# Patient Record
Sex: MALE | Race: BLACK OR AFRICAN AMERICAN | NOT HISPANIC OR LATINO
[De-identification: names, ages, dates, MRNs, and addresses within clinical notes are randomized per-mention and may not be internally consistent; named-entity substitution may affect disease eponyms.]

---

## 2018-01-04 PROBLEM — Z00.00 ENCOUNTER FOR PREVENTIVE HEALTH EXAMINATION: Status: ACTIVE | Noted: 2018-01-04

## 2018-01-17 ENCOUNTER — TRANSCRIPTION ENCOUNTER (OUTPATIENT)
Age: 28
End: 2018-01-17

## 2018-01-17 ENCOUNTER — OUTPATIENT (OUTPATIENT)
Dept: OUTPATIENT SERVICES | Facility: HOSPITAL | Age: 28
LOS: 1 days | Discharge: HOME | End: 2018-01-17

## 2018-01-17 ENCOUNTER — APPOINTMENT (OUTPATIENT)
Dept: INTERNAL MEDICINE | Facility: CLINIC | Age: 28
End: 2018-01-17

## 2018-01-17 VITALS
DIASTOLIC BLOOD PRESSURE: 75 MMHG | HEIGHT: 69 IN | HEART RATE: 72 BPM | WEIGHT: 202 LBS | BODY MASS INDEX: 29.92 KG/M2 | SYSTOLIC BLOOD PRESSURE: 117 MMHG

## 2018-01-17 DIAGNOSIS — Z87.09 PERSONAL HISTORY OF OTHER DISEASES OF THE RESPIRATORY SYSTEM: ICD-10-CM

## 2018-01-17 DIAGNOSIS — Z87.19 PERSONAL HISTORY OF OTHER DISEASES OF THE DIGESTIVE SYSTEM: ICD-10-CM

## 2018-01-17 DIAGNOSIS — J45.20 MILD INTERMITTENT ASTHMA, UNCOMPLICATED: ICD-10-CM

## 2018-01-17 DIAGNOSIS — Z82.69 FAMILY HISTORY OF OTHER DISEASES OF THE MUSCULOSKELETAL SYSTEM AND CONNECTIVE TISSUE: ICD-10-CM

## 2018-01-17 DIAGNOSIS — F17.200 NICOTINE DEPENDENCE, UNSPECIFIED, UNCOMPLICATED: ICD-10-CM

## 2018-01-17 DIAGNOSIS — Z83.3 FAMILY HISTORY OF DIABETES MELLITUS: ICD-10-CM

## 2018-01-17 DIAGNOSIS — Z82.5 FAMILY HISTORY OF ASTHMA AND OTHER CHRONIC LOWER RESPIRATORY DISEASES: ICD-10-CM

## 2018-01-17 RX ORDER — ALBUTEROL SULFATE 90 UG/1
108 (90 BASE) AEROSOL, METERED RESPIRATORY (INHALATION)
Qty: 3 | Refills: 5 | Status: ACTIVE | COMMUNITY
Start: 2018-01-17 | End: 1900-01-01

## 2018-01-17 RX ORDER — ALBUTEROL 90 MCG
90 AEROSOL (GRAM) INHALATION
Refills: 0 | Status: ACTIVE | COMMUNITY

## 2018-07-11 ENCOUNTER — APPOINTMENT (OUTPATIENT)
Dept: INTERNAL MEDICINE | Facility: CLINIC | Age: 28
End: 2018-07-11

## 2018-11-06 ENCOUNTER — EMERGENCY (EMERGENCY)
Facility: HOSPITAL | Age: 28
LOS: 0 days | Discharge: LEFT BEFORE DISCHARGE | End: 2018-11-06
Attending: EMERGENCY MEDICINE | Admitting: EMERGENCY MEDICINE

## 2018-11-06 VITALS
SYSTOLIC BLOOD PRESSURE: 138 MMHG | HEART RATE: 92 BPM | TEMPERATURE: 100 F | RESPIRATION RATE: 16 BRPM | DIASTOLIC BLOOD PRESSURE: 72 MMHG | OXYGEN SATURATION: 100 %

## 2018-11-06 VITALS
HEART RATE: 82 BPM | DIASTOLIC BLOOD PRESSURE: 60 MMHG | RESPIRATION RATE: 18 BRPM | SYSTOLIC BLOOD PRESSURE: 120 MMHG | TEMPERATURE: 98 F

## 2018-11-06 DIAGNOSIS — Z90.49 ACQUIRED ABSENCE OF OTHER SPECIFIED PARTS OF DIGESTIVE TRACT: Chronic | ICD-10-CM

## 2018-11-06 DIAGNOSIS — N49.2 INFLAMMATORY DISORDERS OF SCROTUM: ICD-10-CM

## 2018-11-06 DIAGNOSIS — Z90.49 ACQUIRED ABSENCE OF OTHER SPECIFIED PARTS OF DIGESTIVE TRACT: ICD-10-CM

## 2018-11-06 LAB
ALBUMIN SERPL ELPH-MCNC: 4.5 G/DL — SIGNIFICANT CHANGE UP (ref 3.5–5.2)
ALP SERPL-CCNC: 69 U/L — SIGNIFICANT CHANGE UP (ref 30–115)
ALT FLD-CCNC: 22 U/L — SIGNIFICANT CHANGE UP (ref 0–41)
ANION GAP SERPL CALC-SCNC: 17 MMOL/L — HIGH (ref 7–14)
APTT BLD: 33 SEC — SIGNIFICANT CHANGE UP (ref 27–39.2)
AST SERPL-CCNC: 23 U/L — SIGNIFICANT CHANGE UP (ref 0–41)
BASE EXCESS BLDV CALC-SCNC: 1.7 MMOL/L — SIGNIFICANT CHANGE UP (ref -2–2)
BASOPHILS # BLD AUTO: 0.05 K/UL — SIGNIFICANT CHANGE UP (ref 0–0.2)
BASOPHILS NFR BLD AUTO: 0.3 % — SIGNIFICANT CHANGE UP (ref 0–1)
BILIRUB SERPL-MCNC: 0.4 MG/DL — SIGNIFICANT CHANGE UP (ref 0.2–1.2)
BLD GP AB SCN SERPL QL: SIGNIFICANT CHANGE UP
BUN SERPL-MCNC: 11 MG/DL — SIGNIFICANT CHANGE UP (ref 10–20)
CA-I SERPL-SCNC: 1.19 MMOL/L — SIGNIFICANT CHANGE UP (ref 1.12–1.3)
CALCIUM SERPL-MCNC: 9.5 MG/DL — SIGNIFICANT CHANGE UP (ref 8.5–10.1)
CHLORIDE SERPL-SCNC: 96 MMOL/L — LOW (ref 98–110)
CO2 SERPL-SCNC: 25 MMOL/L — SIGNIFICANT CHANGE UP (ref 17–32)
CREAT SERPL-MCNC: 0.9 MG/DL — SIGNIFICANT CHANGE UP (ref 0.7–1.5)
EOSINOPHIL # BLD AUTO: 0.04 K/UL — SIGNIFICANT CHANGE UP (ref 0–0.7)
EOSINOPHIL NFR BLD AUTO: 0.2 % — SIGNIFICANT CHANGE UP (ref 0–8)
GAS PNL BLDV: 138 MMOL/L — SIGNIFICANT CHANGE UP (ref 136–145)
GAS PNL BLDV: SIGNIFICANT CHANGE UP
GLUCOSE SERPL-MCNC: 86 MG/DL — SIGNIFICANT CHANGE UP (ref 70–99)
HCO3 BLDV-SCNC: 26 MMOL/L — SIGNIFICANT CHANGE UP (ref 22–29)
HCT VFR BLD CALC: 40.8 % — LOW (ref 42–52)
HCT VFR BLDA CALC: 42.1 % — SIGNIFICANT CHANGE UP (ref 34–44)
HGB BLD CALC-MCNC: 13.7 G/DL — LOW (ref 14–18)
HGB BLD-MCNC: 13.9 G/DL — LOW (ref 14–18)
IMM GRANULOCYTES NFR BLD AUTO: 0.6 % — HIGH (ref 0.1–0.3)
INR BLD: 1.16 RATIO — SIGNIFICANT CHANGE UP (ref 0.65–1.3)
LACTATE BLDV-MCNC: 0.8 MMOL/L — SIGNIFICANT CHANGE UP (ref 0.5–1.6)
LYMPHOCYTES # BLD AUTO: 1.54 K/UL — SIGNIFICANT CHANGE UP (ref 1.2–3.4)
LYMPHOCYTES # BLD AUTO: 7.8 % — LOW (ref 20.5–51.1)
MAGNESIUM SERPL-MCNC: 2 MG/DL — SIGNIFICANT CHANGE UP (ref 1.8–2.4)
MCHC RBC-ENTMCNC: 28 PG — SIGNIFICANT CHANGE UP (ref 27–31)
MCHC RBC-ENTMCNC: 34.1 G/DL — SIGNIFICANT CHANGE UP (ref 32–37)
MCV RBC AUTO: 82.1 FL — SIGNIFICANT CHANGE UP (ref 80–94)
MONOCYTES # BLD AUTO: 1.45 K/UL — HIGH (ref 0.1–0.6)
MONOCYTES NFR BLD AUTO: 7.4 % — SIGNIFICANT CHANGE UP (ref 1.7–9.3)
NEUTROPHILS # BLD AUTO: 16.52 K/UL — HIGH (ref 1.4–6.5)
NEUTROPHILS NFR BLD AUTO: 83.7 % — HIGH (ref 42.2–75.2)
NRBC # BLD: 0 /100 WBCS — SIGNIFICANT CHANGE UP (ref 0–0)
PCO2 BLDV: 41 MMHG — SIGNIFICANT CHANGE UP (ref 41–51)
PH BLDV: 7.42 — SIGNIFICANT CHANGE UP (ref 7.26–7.43)
PHOSPHATE SERPL-MCNC: 3.1 MG/DL — SIGNIFICANT CHANGE UP (ref 2.1–4.9)
PLATELET # BLD AUTO: 367 K/UL — SIGNIFICANT CHANGE UP (ref 130–400)
PO2 BLDV: 57 MMHG — HIGH (ref 20–40)
POTASSIUM BLDV-SCNC: 3.6 MMOL/L — SIGNIFICANT CHANGE UP (ref 3.3–5.6)
POTASSIUM SERPL-MCNC: 3.8 MMOL/L — SIGNIFICANT CHANGE UP (ref 3.5–5)
POTASSIUM SERPL-SCNC: 3.8 MMOL/L — SIGNIFICANT CHANGE UP (ref 3.5–5)
PROT SERPL-MCNC: 7.7 G/DL — SIGNIFICANT CHANGE UP (ref 6–8)
PROTHROM AB SERPL-ACNC: 13.3 SEC — HIGH (ref 9.95–12.87)
RBC # BLD: 4.97 M/UL — SIGNIFICANT CHANGE UP (ref 4.7–6.1)
RBC # FLD: 15.1 % — HIGH (ref 11.5–14.5)
SAO2 % BLDV: 91 % — SIGNIFICANT CHANGE UP
SODIUM SERPL-SCNC: 138 MMOL/L — SIGNIFICANT CHANGE UP (ref 135–146)
TYPE + AB SCN PNL BLD: SIGNIFICANT CHANGE UP
WBC # BLD: 19.72 K/UL — HIGH (ref 4.8–10.8)
WBC # FLD AUTO: 19.72 K/UL — HIGH (ref 4.8–10.8)

## 2018-11-06 RX ORDER — SODIUM CHLORIDE 9 MG/ML
1000 INJECTION, SOLUTION INTRAVENOUS ONCE
Qty: 0 | Refills: 0 | Status: COMPLETED | OUTPATIENT
Start: 2018-11-06 | End: 2018-11-06

## 2018-11-06 RX ORDER — PIPERACILLIN AND TAZOBACTAM 4; .5 G/20ML; G/20ML
4.5 INJECTION, POWDER, LYOPHILIZED, FOR SOLUTION INTRAVENOUS ONCE
Qty: 0 | Refills: 0 | Status: COMPLETED | OUTPATIENT
Start: 2018-11-06 | End: 2018-11-06

## 2018-11-06 RX ORDER — VANCOMYCIN HCL 1 G
1000 VIAL (EA) INTRAVENOUS ONCE
Qty: 0 | Refills: 0 | Status: COMPLETED | OUTPATIENT
Start: 2018-11-06 | End: 2018-11-06

## 2018-11-06 RX ORDER — AZTREONAM 2 G
1 VIAL (EA) INJECTION
Qty: 20 | Refills: 0 | OUTPATIENT
Start: 2018-11-06 | End: 2018-11-15

## 2018-11-06 RX ADMIN — Medication 250 MILLIGRAM(S): at 16:26

## 2018-11-06 RX ADMIN — SODIUM CHLORIDE 2000 MILLILITER(S): 9 INJECTION, SOLUTION INTRAVENOUS at 15:14

## 2018-11-06 RX ADMIN — PIPERACILLIN AND TAZOBACTAM 200 GRAM(S): 4; .5 INJECTION, POWDER, LYOPHILIZED, FOR SOLUTION INTRAVENOUS at 17:56

## 2018-11-06 RX ADMIN — Medication 1000 MILLIGRAM(S): at 17:56

## 2018-11-06 RX ADMIN — SODIUM CHLORIDE 1000 MILLILITER(S): 9 INJECTION, SOLUTION INTRAVENOUS at 16:27

## 2018-11-06 NOTE — ED PROVIDER NOTE - OBJECTIVE STATEMENT
27 y/o male with no PMH who presents to ED for 2 day history of right scrotal abscess associated with tactile fever and chills. Took Excedrin 2 trabs at 1000 for pain.

## 2018-11-06 NOTE — ED ADULT NURSE NOTE - NSIMPLEMENTINTERV_GEN_ALL_ED
Implemented All Universal Safety Interventions:  [place] to call system. Call bell, personal items and telephone within reach. Instruct patient to call for assistance. Room bathroom lighting operational. Non-slip footwear when patient is off stretcher. Physically safe environment: no spills, clutter or unnecessary equipment. Stretcher in lowest position, wheels locked, appropriate side rails in place.

## 2018-11-06 NOTE — ED PROVIDER NOTE - ATTENDING CONTRIBUTION TO CARE
29 y/o male with no sig pmhx in ER with c/o 2 day h/o pain/swelling and purulent d/c to R scrotum.  no abd pain.  no n/v/d. no dysuria or penile discharge. denies any trauma to area.  + sexually active with multiple partners.  + fever/chills.  pe - nad, nc/at, eomi, perrl, op - clear, mmm, cta b/l, no w/r/r, rrr, abd- soft, nt/nd, nabs, gu - uncirc male, no penile tenderness or d/c, R scrotum with thickened skin, tender, + purulent drainage from open wound, from x 4, A&O x 3, no focal neur deficits.  -iv abx, check labs, testicular sono, gu consult.

## 2018-11-06 NOTE — CONSULT NOTE ADULT - ASSESSMENT
29 y/o male with scrotal abscess - refusing I&D and all other inpatient treatment. Wants to sign out AMA  - Clinda 300 q8 and Bactrim DS q12  - f/u with Dr. Schwartz in the office tomorrow  - Return to ED if fevers >101.3 or intractable pain/vomiting.   - Will discuss with Dr. Schwartz 29 y/o male with scrotal abscess - refusing I&D and all other inpatient treatment. Wants to sign out AMA  - Clinda 300 q8 and Bactrim DS q12  - Return to ED if fevers >101.3 or intractable pain/vomiting.   - Will discuss with Dr. Schwartz

## 2018-11-06 NOTE — ED PROVIDER NOTE - PHYSICAL EXAMINATION
CONSTITUTIONAL: Well-developed; well-nourished; in no acute distress.   SKIN: warm, dry  HEAD: Normocephalic; atraumatic.  EYES: no conjunctival injection. PERRL.   ENT: No nasal discharge; airway clear.  NECK: Supple; non tender.  CARD: S1, S2 normal; no murmurs, gallops, or rubs. Regular rate and rhythm.   RESP: No wheezes, rales or rhonchi.  ABD: soft ntnd  : swelling tenderness and erythema over the right testicle with skin sloughing and area of drainage.   EXT: Normal ROM.  No clubbing, cyanosis or edema.   LYMPH: No acute cervical adenopathy.  NEURO: Alert, oriented, grossly unremarkable  PSYCH: Cooperative, appropriate.

## 2018-11-06 NOTE — ED PROVIDER NOTE - CARE PROVIDER_API CALL
Dimple Schwartz), Urology  [address] [place] [address]  [address] [place] [address]  Phone: [phone]  Fax: [phone]

## 2018-11-06 NOTE — ED PROVIDER NOTE - PROGRESS NOTE DETAILS
Discussed with urology who will come see patient at bedside. urology recommends admission for iv abx and surgical drainage. after multiple lengthy discussions with the patient explaining the need for admission patient declines to stay in the hospital. Patient would like to sign out AMA because he wants to leave to go buy cigarettes and states he will come back later tonight or in the morning. prescription for clindamycin and bactrim given to the patient and he was instructed to take antibiotics and urged to return to ED ASAP.

## 2018-11-06 NOTE — ED PROVIDER NOTE - NS ED ROS FT

## 2018-11-06 NOTE — ED PROVIDER NOTE - CARE PROVIDERS DIRECT ADDRESSES
[email or web address]

## 2018-11-06 NOTE — ED PROVIDER NOTE - MEDICAL DECISION MAKING DETAILS
pt in ER with R scrotal abscess x 2 days, + f/c, wbc 19K, given ivf, iv abx, seen by gu, recommended for admission for drainage, however pt refuses admission. Extensive discussions with pt strongly encouraging admission, pt told of high risk of sepsis, loss of testicle, multisystem organ failure, possible death or permanent disability - pt understands but still adamantly refuses admission.  states he's not 'in the right frame of mind' to stay in hospital, wants to have family member with him.  offered to speak with family, pt refused.  pt states when he had appendicitis, he also left the hospital AMA but eventually returned.  Pt states he will most likely return later tonight or tomorrow after he's had a chance to 'clear his head.'  spoke with pt multiple times, as did the resident and the urology service.  pt continues to refuse admission.  to s/o ama, po abx ordered.  pt told he can return to the ER at any time to continue his evaluation.  pt s/o ama - a& o x 3, no intoxication.

## 2018-11-06 NOTE — CONSULT NOTE ADULT - SUBJECTIVE AND OBJECTIVE BOX
Patient is a 28y old  Male who presents with a chief complaint of R groin pain x 2 days    HPI: 28/yo male with R groin pain x 2 days. Reports active purulent drainage from R groin since this morning. Denies any trauma to the area. He is sexually active with multiple partners. No hematuria/dysuria or difficulty voiding. No fevers, chills, N/V.    Pt refusing I&D at bedside at this time. Stating he is not in the right state of mind at this time and will come back another time. He understands fully the seriousness of the situation that this developing abscess can lead to necrosis and loss of tissue and sepsis and even death if it goes untreated. He acknowledges he understands and would like to sign out AMA.        PAST MEDICAL & SURGICAL HISTORY:      REVIEW OF SYSTEMS:    CONSTITUTIONAL:  No fevers or chills  HEENT: No visual changes  ENDO: No sweating  NECK: No pain or stiffness  MUSCULOSKELETAL: No back pain, no joint pain  RESPIRATORY: No shortness of breath  CARDIOVASCULAR: No chest pain  GASTROINTESTINAL: No abdominal or epigastric pain. No nausea, vomiting,  No diarrhea or constipation.   NEUROLOGICAL: No mental status changes  PSYCH: No depression, no mood changes  SKIN: No itching      MEDICATIONS  (STANDING):  piperacillin/tazobactam IVPB. 4.5 Gram(s) IV Intermittent once    MEDICATIONS  (PRN):      Allergies    No Known Allergies    Intolerances        SOCIAL HISTORY: No illicit drug use    FAMILY HISTORY:      Vital Signs Last 24 Hrs  T(C): 36.9 (06 Nov 2018 15:48), Max: 37.8 (06 Nov 2018 13:56)  T(F): 98.4 (06 Nov 2018 15:48), Max: 100.1 (06 Nov 2018 13:56)  HR: 82 (06 Nov 2018 15:48) (82 - 92)  BP: 120/60 (06 Nov 2018 15:48) (120/60 - 138/72)  BP(mean): --  RR: 18 (06 Nov 2018 15:48) (16 - 18)  SpO2: 100% (06 Nov 2018 13:56) (100% - 100%)    PHYSICAL EXAM:    Constitutional: NAD, well-developed, awake/alert, NAD  HEENT: EOMI  Neck: no pain  Back: No CVA tenderness b/l  Respiratory: No accessory respiratory muscle use  Abd: Soft, NT/ND, bladder non palpable, no suprapubic tenderness  : + skin thickening to R groin, + TTP +erythema and skin breakdown with active drainage to lower portion.   Extremities: no edema  Neurological: A/O x 3  Psychiatric: Normal mood, normal affect      I&O's Summary      LABS:                        13.9   19.72 )-----------( 367      ( 06 Nov 2018 14:34 )             40.8     11-06    138  |  96<L>  |  11  ----------------------------<  86  3.8   |  25  |  0.9    Ca    9.5      06 Nov 2018 14:34  Phos  3.1     11-06  Mg     2.0     11-06    TPro  7.7  /  Alb  4.5  /  TBili  0.4  /  DBili  x   /  AST  23  /  ALT  22  /  AlkPhos  69  11-06    PT/INR - ( 06 Nov 2018 14:34 )   PT: 13.30 sec;   INR: 1.16 ratio         PTT - ( 06 Nov 2018 14:34 )  PTT:33.0 sec    Urine Culture:         RADIOLOGY & ADDITIONAL STUDIES:  < from: US Testicles (11.06.18 @ 16:06) >    EXAM:  US SCROTUM AND CONTENTS            PROCEDURE DATE:  11/06/2018            INTERPRETATION:  CLINICAL HISTORY:  Right scrotal abscess.    COMPARISON:  None    TECHNIQUE: Testicular ultrasound with grayscale, color Doppler and   spectral analysis. Evaluation for varicoceles performed with and without   Valsalva in supine and upright positions.    RIGHT: The right testis is homogeneous measuring 4.1 x 2.7 x 2.5 cm,   without an intrinsic mass. The right epididymal head measures 1.3 cm. In   the tissues of the lateral right scrotum, there is a 8.0 x 4.6 x 5.6 cm   area of hypoechoic and heterogeneous material without internal flow but   with adjacent peripheral inflamed fat and hyperemia. 3.7 cc right   hydrocele.      LEFT: The left testis is homogeneous measuring 3.9 x 2.5 x 2.3 cm,   without an intrinsic mass. The left epididymal head measures 1.1 cm and   contains a 0.2 cm cyst. 5.6 cc left-sided hydrocele.     VASCULARITY: Normal symmetric flow is demonstrated to both testes.    IMPRESSION:    8 x 4.6 x 5.6 cm right scrotal phlegmon/developing abscess with   surrounding edematous, inflamed and thickened skin.    No testicular torsion, intratesticular abscess or epididymoorchitis.                  BETITO HENDERSON M.D., ATTENDING RADIOLOGIST  This document has been electronically signed. Nov 6 2018  4:29PM                < end of copied text >

## 2018-11-06 NOTE — ED ADULT NURSE NOTE - CHIEF COMPLAINT QUOTE
c/o scrotal pain, sent from [facility] urgent care for further evaluation and treatment of scrotal abscess

## 2018-11-06 NOTE — ED ADULT TRIAGE NOTE - CHIEF COMPLAINT QUOTE
c/o scrotal pain, sent from [facility] urgent care for further evaluation and treatment of scrotal abscess

## 2018-11-11 LAB
CULTURE RESULTS: SIGNIFICANT CHANGE UP
CULTURE RESULTS: SIGNIFICANT CHANGE UP
SPECIMEN SOURCE: SIGNIFICANT CHANGE UP
SPECIMEN SOURCE: SIGNIFICANT CHANGE UP

## 2019-04-01 ENCOUNTER — OUTPATIENT (OUTPATIENT)
Dept: OUTPATIENT SERVICES | Facility: HOSPITAL | Age: 29
LOS: 1 days | End: 2019-04-01
Payer: COMMERCIAL

## 2019-04-01 DIAGNOSIS — Z90.49 ACQUIRED ABSENCE OF OTHER SPECIFIED PARTS OF DIGESTIVE TRACT: Chronic | ICD-10-CM

## 2019-04-01 PROCEDURE — G9001: CPT

## 2019-04-11 ENCOUNTER — EMERGENCY (EMERGENCY)
Facility: HOSPITAL | Age: 29
LOS: 0 days | Discharge: HOME | End: 2019-04-11
Attending: EMERGENCY MEDICINE | Admitting: EMERGENCY MEDICINE
Payer: MEDICAID

## 2019-04-11 VITALS
DIASTOLIC BLOOD PRESSURE: 80 MMHG | WEIGHT: 210.1 LBS | HEART RATE: 114 BPM | HEIGHT: 71 IN | OXYGEN SATURATION: 98 % | RESPIRATION RATE: 18 BRPM | SYSTOLIC BLOOD PRESSURE: 126 MMHG | TEMPERATURE: 100 F

## 2019-04-11 VITALS — SYSTOLIC BLOOD PRESSURE: 132 MMHG | DIASTOLIC BLOOD PRESSURE: 60 MMHG | HEART RATE: 75 BPM

## 2019-04-11 DIAGNOSIS — N49.2 INFLAMMATORY DISORDERS OF SCROTUM: ICD-10-CM

## 2019-04-11 DIAGNOSIS — J45.909 UNSPECIFIED ASTHMA, UNCOMPLICATED: ICD-10-CM

## 2019-04-11 DIAGNOSIS — Z90.49 ACQUIRED ABSENCE OF OTHER SPECIFIED PARTS OF DIGESTIVE TRACT: ICD-10-CM

## 2019-04-11 DIAGNOSIS — Z90.49 ACQUIRED ABSENCE OF OTHER SPECIFIED PARTS OF DIGESTIVE TRACT: Chronic | ICD-10-CM

## 2019-04-11 DIAGNOSIS — Z79.2 LONG TERM (CURRENT) USE OF ANTIBIOTICS: ICD-10-CM

## 2019-04-11 LAB
ALBUMIN SERPL ELPH-MCNC: 4.4 G/DL — SIGNIFICANT CHANGE UP (ref 3.5–5.2)
ALP SERPL-CCNC: 66 U/L — SIGNIFICANT CHANGE UP (ref 30–115)
ALT FLD-CCNC: 15 U/L — SIGNIFICANT CHANGE UP (ref 0–41)
ANION GAP SERPL CALC-SCNC: 14 MMOL/L — SIGNIFICANT CHANGE UP (ref 7–14)
AST SERPL-CCNC: 19 U/L — SIGNIFICANT CHANGE UP (ref 0–41)
BASOPHILS # BLD AUTO: 0.05 K/UL — SIGNIFICANT CHANGE UP (ref 0–0.2)
BASOPHILS NFR BLD AUTO: 0.2 % — SIGNIFICANT CHANGE UP (ref 0–1)
BILIRUB SERPL-MCNC: 0.5 MG/DL — SIGNIFICANT CHANGE UP (ref 0.2–1.2)
BUN SERPL-MCNC: 10 MG/DL — SIGNIFICANT CHANGE UP (ref 10–20)
CALCIUM SERPL-MCNC: 9.8 MG/DL — SIGNIFICANT CHANGE UP (ref 8.5–10.1)
CHLORIDE SERPL-SCNC: 100 MMOL/L — SIGNIFICANT CHANGE UP (ref 98–110)
CO2 SERPL-SCNC: 24 MMOL/L — SIGNIFICANT CHANGE UP (ref 17–32)
CREAT SERPL-MCNC: 0.9 MG/DL — SIGNIFICANT CHANGE UP (ref 0.7–1.5)
EOSINOPHIL # BLD AUTO: 0.03 K/UL — SIGNIFICANT CHANGE UP (ref 0–0.7)
EOSINOPHIL NFR BLD AUTO: 0.1 % — SIGNIFICANT CHANGE UP (ref 0–8)
GLUCOSE SERPL-MCNC: 101 MG/DL — HIGH (ref 70–99)
HCT VFR BLD CALC: 44 % — SIGNIFICANT CHANGE UP (ref 42–52)
HGB BLD-MCNC: 15 G/DL — SIGNIFICANT CHANGE UP (ref 14–18)
IMM GRANULOCYTES NFR BLD AUTO: 0.7 % — HIGH (ref 0.1–0.3)
LACTATE SERPL-SCNC: 1.3 MMOL/L — SIGNIFICANT CHANGE UP (ref 0.5–2.2)
LYMPHOCYTES # BLD AUTO: 0.98 K/UL — LOW (ref 1.2–3.4)
LYMPHOCYTES # BLD AUTO: 4.8 % — LOW (ref 20.5–51.1)
MCHC RBC-ENTMCNC: 28.1 PG — SIGNIFICANT CHANGE UP (ref 27–31)
MCHC RBC-ENTMCNC: 34.1 G/DL — SIGNIFICANT CHANGE UP (ref 32–37)
MCV RBC AUTO: 82.6 FL — SIGNIFICANT CHANGE UP (ref 80–94)
MONOCYTES # BLD AUTO: 1.17 K/UL — HIGH (ref 0.1–0.6)
MONOCYTES NFR BLD AUTO: 5.7 % — SIGNIFICANT CHANGE UP (ref 1.7–9.3)
NEUTROPHILS # BLD AUTO: 17.98 K/UL — HIGH (ref 1.4–6.5)
NEUTROPHILS NFR BLD AUTO: 88.5 % — HIGH (ref 42.2–75.2)
NRBC # BLD: 0 /100 WBCS — SIGNIFICANT CHANGE UP (ref 0–0)
PLATELET # BLD AUTO: 465 K/UL — HIGH (ref 130–400)
POTASSIUM SERPL-MCNC: 4.6 MMOL/L — SIGNIFICANT CHANGE UP (ref 3.5–5)
POTASSIUM SERPL-SCNC: 4.6 MMOL/L — SIGNIFICANT CHANGE UP (ref 3.5–5)
PROT SERPL-MCNC: 7.7 G/DL — SIGNIFICANT CHANGE UP (ref 6–8)
RBC # BLD: 5.33 M/UL — SIGNIFICANT CHANGE UP (ref 4.7–6.1)
RBC # FLD: 14.6 % — HIGH (ref 11.5–14.5)
SODIUM SERPL-SCNC: 138 MMOL/L — SIGNIFICANT CHANGE UP (ref 135–146)
WBC # BLD: 20.36 K/UL — HIGH (ref 4.8–10.8)
WBC # FLD AUTO: 20.36 K/UL — HIGH (ref 4.8–10.8)

## 2019-04-11 PROCEDURE — 99285 EMERGENCY DEPT VISIT HI MDM: CPT

## 2019-04-11 PROCEDURE — 76870 US EXAM SCROTUM: CPT | Mod: 26

## 2019-04-11 RX ORDER — HYDROMORPHONE HYDROCHLORIDE 2 MG/ML
1 INJECTION INTRAMUSCULAR; INTRAVENOUS; SUBCUTANEOUS EVERY 4 HOURS
Qty: 0 | Refills: 0 | Status: DISCONTINUED | OUTPATIENT
Start: 2019-04-11 | End: 2019-04-11

## 2019-04-11 RX ORDER — SODIUM CHLORIDE 9 MG/ML
1000 INJECTION INTRAMUSCULAR; INTRAVENOUS; SUBCUTANEOUS ONCE
Qty: 0 | Refills: 0 | Status: COMPLETED | OUTPATIENT
Start: 2019-04-11 | End: 2019-04-11

## 2019-04-11 RX ORDER — AZTREONAM 2 G
1 VIAL (EA) INJECTION
Qty: 20 | Refills: 0 | OUTPATIENT
Start: 2019-04-11 | End: 2019-04-20

## 2019-04-11 RX ORDER — MORPHINE SULFATE 50 MG/1
4 CAPSULE, EXTENDED RELEASE ORAL ONCE
Qty: 0 | Refills: 0 | Status: DISCONTINUED | OUTPATIENT
Start: 2019-04-11 | End: 2019-04-11

## 2019-04-11 RX ORDER — ACETAMINOPHEN 500 MG
650 TABLET ORAL ONCE
Qty: 0 | Refills: 0 | Status: COMPLETED | OUTPATIENT
Start: 2019-04-11 | End: 2019-04-11

## 2019-04-11 RX ADMIN — Medication 650 MILLIGRAM(S): at 11:39

## 2019-04-11 RX ADMIN — MORPHINE SULFATE 4 MILLIGRAM(S): 50 CAPSULE, EXTENDED RELEASE ORAL at 11:39

## 2019-04-11 RX ADMIN — HYDROMORPHONE HYDROCHLORIDE 1 MILLIGRAM(S): 2 INJECTION INTRAMUSCULAR; INTRAVENOUS; SUBCUTANEOUS at 13:33

## 2019-04-11 RX ADMIN — SODIUM CHLORIDE 1000 MILLILITER(S): 9 INJECTION INTRAMUSCULAR; INTRAVENOUS; SUBCUTANEOUS at 13:22

## 2019-04-11 RX ADMIN — SODIUM CHLORIDE 1000 MILLILITER(S): 9 INJECTION INTRAMUSCULAR; INTRAVENOUS; SUBCUTANEOUS at 11:40

## 2019-04-11 RX ADMIN — MORPHINE SULFATE 4 MILLIGRAM(S): 50 CAPSULE, EXTENDED RELEASE ORAL at 13:22

## 2019-04-11 NOTE — CONSULT NOTE ADULT - ASSESSMENT
29 y/o male with abscess to the R hemiscrotum - s/p I&D  - pain control  - Bactrim x14 days  - VNS services for daily packing changes  - f/u with Dr. Murcia in office next week  - Return to ED if fever >101.3, intractable pain/vomiting  - Discussed with Dr. Murcia 29 y/o male with abscess to the R hemiscrotum - s/p I&D  - pain control  - Bactrim x14 days  - VNS services for daily packing changes  - f/u with Dr. Murcia in office next week  - Return to ED if fever >101.3, intractable pain/vomiting  - f/u abscess culture  - Discussed with Dr. Murcia

## 2019-04-11 NOTE — ED PROVIDER NOTE - NS ED ROS FT
Review of Systems:  	•	CONSTITUTIONAL - no fever, no diaphoresis, no chills  	•	SKIN - + abscess  	•	HEMATOLOGIC - no bleeding, no bruising  	•	EYES - no eye pain, no blurry vision  	•	ENT - no change in hearing, no sore throat, no ear pain or tinnitus  	•	RESPIRATORY - no shortness of breath, no cough  	•	CARDIAC - no chest pain, no palpitations  	•	GI - no abd pain, no nausea, no vomiting, no diarrhea, no constipation  	•	GENITO-URINARY - no discharge, no dysuria; no hematuria, no increased urinary frequency  	•	MUSCULOSKELETAL - no joint paint, no swelling, no redness  	•	NEUROLOGIC - no weakness, no headache, no paresthesias, no LOC

## 2019-04-11 NOTE — ED ADULT NURSE NOTE - OBJECTIVE STATEMENT
patient has right grion abscess for 2 days denies and bug bit or pimple prior,states hasn't been walking as much had low grade fever today. denies n/v/d/sob

## 2019-04-11 NOTE — ED PROVIDER NOTE - CLINICAL SUMMARY MEDICAL DECISION MAKING FREE TEXT BOX
I have fully discussed the medical management and delivery of care with the patient / guardian. I have discussed any available labs, imaging and treatment options with the patient / guardian and any diagnostic results supporting the patient's diagnosis. Please see progress notes, attending note and above notations for further mdm. Chart completed.

## 2019-04-11 NOTE — CONSULT NOTE ADULT - SUBJECTIVE AND OBJECTIVE BOX
HPI:  29 y/o male with pMHx of Asthma presents with scrotal pain and tenderness x2 days. Scrotal sono reveals a 6cm abscess. Pt has hx of scrotal abscess to the same area and was seen by urology in 11/2018. Pt was advised at his initial visit in November that he needed I&D of abscess and packing of wound but elected to sign out AMA and was sent home with antiobiotics. He reports resolution of symptoms in Novemeber with jsut antibiotics but now returns with a developed collection. Denies any hematuria, dysuria, difficulty voiding, fevers, chills, N/V.    Pt signed consent for I&D of abscess at bedside and all questions were answered.    Scrotal abscess site was prepped with iodine solution and pt was given 1mg dilaudid prior to procedure. Abscess began spontaneously draining. Area was numbed using lidocaine with epi in multiple circumferential sites. Using 11 blade, 3cm incision was made 25cc or purulent drainage was evacuated. Loculations were broken up manually and wound was washed using hydrogen peroxide thoroughly. Rinsed with saline  and followed by 1/2" packing of wound. Dressed with wed to dry 4x4 and wrapped with kerlex. Pt tolerated procedure well without any complications.    PAST MEDICAL & SURGICAL HISTORY:  No pertinent past medical history  History of appendectomy      REVIEW OF SYSTEMS:    CONSTITUTIONAL:  No fevers or chills  HEENT: No visual changes  ENDO: No sweating  NECK: No pain or stiffness  MUSCULOSKELETAL: No back pain, no joint pain  RESPIRATORY: No shortness of breath  CARDIOVASCULAR: No chest pain  GASTROINTESTINAL: No abdominal or epigastric pain. No nausea, vomiting,  No diarrhea or constipation.   NEUROLOGICAL: No mental status changes  PSYCH: No depression, no mood changes  SKIN: No itching      MEDICATIONS  (STANDING):  HYDROmorphone  Injectable 1 milliGRAM(s) IV Push every 4 hours    MEDICATIONS  (PRN):      Allergies    No Known Allergies    Intolerances        SOCIAL HISTORY: No illicit drug use    FAMILY HISTORY:      Vital Signs Last 24 Hrs  T(C): 37.6 (11 Apr 2019 10:00), Max: 37.6 (11 Apr 2019 10:00)  T(F): 99.7 (11 Apr 2019 10:00), Max: 99.7 (11 Apr 2019 10:00)  HR: 80 (11 Apr 2019 15:07) (80 - 114)  BP: 126/80 (11 Apr 2019 10:00) (126/80 - 126/80)  BP(mean): --  RR: 18 (11 Apr 2019 10:00) (18 - 18)  SpO2: 98% (11 Apr 2019 10:00) (98% - 98%)    PHYSICAL EXAM:    Constitutional: NAD, well-developed, awake/alert  HEENT: EOMI  Neck: no pain  Back: No CVA tenderness B/L  Respiratory: No accessory respiratory muscle use  Abd: Soft, NT/ND, bladder non palpable, no suprapubic tenderness, no organomegally  no hernia  : + scrotal fluid collection to R hemiscrotum. + TTP, + fluctuance, + spontaneous drainage, No crepitus, no penile discharge, non circumcised  Extremities: no edema  Neurological: A/O x 3  Psychiatric: Normal mood, normal affect      I&O's Summary      LABS:                        15.0   20.36 )-----------( 465      ( 11 Apr 2019 11:39 )             44.0     04-11    138  |  100  |  10  ----------------------------<  101<H>  4.6   |  24  |  0.9    Ca    9.8      11 Apr 2019 11:39    TPro  7.7  /  Alb  4.4  /  TBili  0.5  /  DBili  x   /  AST  19  /  ALT  15  /  AlkPhos  66  04-11              RADIOLOGY & ADDITIONAL STUDIES:  < from: US Testicles (04.11.19 @ 11:58) >    EXAM:  US SCROTUM AND CONTENTS            PROCEDURE DATE:  04/11/2019            INTERPRETATION:  CLINICAL HISTORY:  Abscess. Follow-up.    COMPARISON:  CT scan and ultrasound that is dated November 6, 2018    TECHNIQUE: Testicular ultrasound with grayscale, color Doppler and   spectral analysis.    RIGHT: Right testicle measures 4 cm x 3 cm x 2 cm. It is normal in size   and in echogenicity. Good flow is seen. The epididymis is normal.    Seen adjacent to the right lateral scrotum is a 6 cm x 4 cm heterogeneous   area that is likely an abscess. It is grossly unchanged.     LEFT: Left testicle measures 4 cm x 2.3 cm x 2 cm. It is normal in size   and in echogenicity. Good flow is seen. There is a left-sided hydrocele   for a volume of 6 cc.    VASCULARITY: Normal symmetric flow is demonstrated to both testes.    IMPRESSION:    6 cm right scrotal abscess seen on prior study.    Small left-sided hydrocele.                  ELEUTERIO ALARCON M.D., ATTENDING RADIOLOGIST  This document has been electronically signed. Apr 11 2019 12:04PM                < end of copied text > HPI:  29 y/o male with pMHx of Asthma presents with scrotal pain and tenderness x2 days. Scrotal sono reveals a 6cm abscess. Pt has hx of scrotal abscess to the same area and was seen by urology in 11/2018. Pt was advised at his initial visit in November that he needed I&D of abscess and packing of wound but elected to sign out AMA and was sent home with antiobiotics. He reports resolution of symptoms in Novemeber with jsut antibiotics but now returns with a developed collection. Denies any hematuria, dysuria, difficulty voiding, fevers, chills, N/V.    Pt signed consent for I&D of abscess at bedside and all questions were answered.    Scrotal abscess site was prepped with iodine solution and pt was given 1mg dilaudid prior to procedure. Abscess began spontaneously draining. Area was numbed using lidocaine with epi in multiple circumferential sites. Using 11 blade, 3cm incision was made 25cc or purulent drainage was evacuated. Loculations were broken up manually and wound was washed using hydrogen peroxide thoroughly. Rinsed with saline  and followed by 1/2" packing of wound. Dressed with wed to dry 4x4 and wrapped with kerlex. Pt tolerated procedure well without any complications. Abscess culture was obtained.    PAST MEDICAL & SURGICAL HISTORY:  No pertinent past medical history  History of appendectomy      REVIEW OF SYSTEMS:    CONSTITUTIONAL:  No fevers or chills  HEENT: No visual changes  ENDO: No sweating  NECK: No pain or stiffness  MUSCULOSKELETAL: No back pain, no joint pain  RESPIRATORY: No shortness of breath  CARDIOVASCULAR: No chest pain  GASTROINTESTINAL: No abdominal or epigastric pain. No nausea, vomiting,  No diarrhea or constipation.   NEUROLOGICAL: No mental status changes  PSYCH: No depression, no mood changes  SKIN: No itching      MEDICATIONS  (STANDING):  HYDROmorphone  Injectable 1 milliGRAM(s) IV Push every 4 hours    MEDICATIONS  (PRN):      Allergies    No Known Allergies    Intolerances        SOCIAL HISTORY: No illicit drug use    FAMILY HISTORY:      Vital Signs Last 24 Hrs  T(C): 37.6 (11 Apr 2019 10:00), Max: 37.6 (11 Apr 2019 10:00)  T(F): 99.7 (11 Apr 2019 10:00), Max: 99.7 (11 Apr 2019 10:00)  HR: 80 (11 Apr 2019 15:07) (80 - 114)  BP: 126/80 (11 Apr 2019 10:00) (126/80 - 126/80)  BP(mean): --  RR: 18 (11 Apr 2019 10:00) (18 - 18)  SpO2: 98% (11 Apr 2019 10:00) (98% - 98%)    PHYSICAL EXAM:    Constitutional: NAD, well-developed, awake/alert  HEENT: EOMI  Neck: no pain  Back: No CVA tenderness B/L  Respiratory: No accessory respiratory muscle use  Abd: Soft, NT/ND, bladder non palpable, no suprapubic tenderness, no organomegally  no hernia  : + scrotal fluid collection to R hemiscrotum. + TTP, + fluctuance, + spontaneous drainage, No crepitus, no penile discharge, non circumcised  Extremities: no edema  Neurological: A/O x 3  Psychiatric: Normal mood, normal affect      I&O's Summary      LABS:                        15.0   20.36 )-----------( 465      ( 11 Apr 2019 11:39 )             44.0     04-11    138  |  100  |  10  ----------------------------<  101<H>  4.6   |  24  |  0.9    Ca    9.8      11 Apr 2019 11:39    TPro  7.7  /  Alb  4.4  /  TBili  0.5  /  DBili  x   /  AST  19  /  ALT  15  /  AlkPhos  66  04-11              RADIOLOGY & ADDITIONAL STUDIES:  < from: US Testicles (04.11.19 @ 11:58) >    EXAM:  US SCROTUM AND CONTENTS            PROCEDURE DATE:  04/11/2019            INTERPRETATION:  CLINICAL HISTORY:  Abscess. Follow-up.    COMPARISON:  CT scan and ultrasound that is dated November 6, 2018    TECHNIQUE: Testicular ultrasound with grayscale, color Doppler and   spectral analysis.    RIGHT: Right testicle measures 4 cm x 3 cm x 2 cm. It is normal in size   and in echogenicity. Good flow is seen. The epididymis is normal.    Seen adjacent to the right lateral scrotum is a 6 cm x 4 cm heterogeneous   area that is likely an abscess. It is grossly unchanged.     LEFT: Left testicle measures 4 cm x 2.3 cm x 2 cm. It is normal in size   and in echogenicity. Good flow is seen. There is a left-sided hydrocele   for a volume of 6 cc.    VASCULARITY: Normal symmetric flow is demonstrated to both testes.    IMPRESSION:    6 cm right scrotal abscess seen on prior study.    Small left-sided hydrocele.                  ELEUTERIO ALARCON M.D., ATTENDING RADIOLOGIST  This document has been electronically signed. Apr 11 2019 12:04PM                < end of copied text > HPI:  29 y/o male with pMHx of Asthma presents with scrotal pain and tenderness x2 days. Scrotal sono reveals a 6cm abscess. Pt has hx of scrotal abscess to the same area and was seen by urology in 11/2018. Pt was advised at his initial visit in November that he needed I&D of abscess and packing of wound but elected to sign out AMA and was sent home with antiobiotics. He reports resolution of symptoms in Novemeber with jsut antibiotics but now returns with a developed collection. Denies any hematuria, dysuria, difficulty voiding, fevers, chills, N/V.    Pt signed consent for I&D of abscess at bedside and all questions were answered.    Scrotal abscess site was prepped with iodine solution and pt was given 1mg dilaudid prior to procedure. Abscess began spontaneously draining. Area was numbed using lidocaine with epi in multiple circumferential sites. Using 11 blade, 3cm incision was made 25cc or purulent drainage was evacuated. Loculations were broken up manually and wound was washed using hydrogen peroxide thoroughly. Rinsed with saline  and followed by 1/2" packing of wound. Dressed with wed to dry 4x4 and wrapped with kerlex. Pt tolerated procedure well without any complications. Abscess culture was obtained.  Supervised and assisted by ALLISON Mclaughlin.    PAST MEDICAL & SURGICAL HISTORY:  No pertinent past medical history  History of appendectomy      REVIEW OF SYSTEMS:    CONSTITUTIONAL:  No fevers or chills  HEENT: No visual changes  ENDO: No sweating  NECK: No pain or stiffness  MUSCULOSKELETAL: No back pain, no joint pain  RESPIRATORY: No shortness of breath  CARDIOVASCULAR: No chest pain  GASTROINTESTINAL: No abdominal or epigastric pain. No nausea, vomiting,  No diarrhea or constipation.   NEUROLOGICAL: No mental status changes  PSYCH: No depression, no mood changes  SKIN: No itching      MEDICATIONS  (STANDING):  HYDROmorphone  Injectable 1 milliGRAM(s) IV Push every 4 hours    MEDICATIONS  (PRN):      Allergies    No Known Allergies    Intolerances        SOCIAL HISTORY: No illicit drug use    FAMILY HISTORY:      Vital Signs Last 24 Hrs  T(C): 37.6 (11 Apr 2019 10:00), Max: 37.6 (11 Apr 2019 10:00)  T(F): 99.7 (11 Apr 2019 10:00), Max: 99.7 (11 Apr 2019 10:00)  HR: 80 (11 Apr 2019 15:07) (80 - 114)  BP: 126/80 (11 Apr 2019 10:00) (126/80 - 126/80)  BP(mean): --  RR: 18 (11 Apr 2019 10:00) (18 - 18)  SpO2: 98% (11 Apr 2019 10:00) (98% - 98%)    PHYSICAL EXAM:    Constitutional: NAD, well-developed, awake/alert  HEENT: EOMI  Neck: no pain  Back: No CVA tenderness B/L  Respiratory: No accessory respiratory muscle use  Abd: Soft, NT/ND, bladder non palpable, no suprapubic tenderness, no organomegally  no hernia  : + scrotal fluid collection to R hemiscrotum. + TTP, + fluctuance, + spontaneous drainage, No crepitus, no penile discharge, non circumcised  Extremities: no edema  Neurological: A/O x 3  Psychiatric: Normal mood, normal affect      I&O's Summary      LABS:                        15.0   20.36 )-----------( 465      ( 11 Apr 2019 11:39 )             44.0     04-11    138  |  100  |  10  ----------------------------<  101<H>  4.6   |  24  |  0.9    Ca    9.8      11 Apr 2019 11:39    TPro  7.7  /  Alb  4.4  /  TBili  0.5  /  DBili  x   /  AST  19  /  ALT  15  /  AlkPhos  66  04-11              RADIOLOGY & ADDITIONAL STUDIES:  < from: US Testicles (04.11.19 @ 11:58) >    EXAM:  US SCROTUM AND CONTENTS            PROCEDURE DATE:  04/11/2019            INTERPRETATION:  CLINICAL HISTORY:  Abscess. Follow-up.    COMPARISON:  CT scan and ultrasound that is dated November 6, 2018    TECHNIQUE: Testicular ultrasound with grayscale, color Doppler and   spectral analysis.    RIGHT: Right testicle measures 4 cm x 3 cm x 2 cm. It is normal in size   and in echogenicity. Good flow is seen. The epididymis is normal.    Seen adjacent to the right lateral scrotum is a 6 cm x 4 cm heterogeneous   area that is likely an abscess. It is grossly unchanged.     LEFT: Left testicle measures 4 cm x 2.3 cm x 2 cm. It is normal in size   and in echogenicity. Good flow is seen. There is a left-sided hydrocele   for a volume of 6 cc.    VASCULARITY: Normal symmetric flow is demonstrated to both testes.    IMPRESSION:    6 cm right scrotal abscess seen on prior study.    Small left-sided hydrocele.                  ELEUTERIO ALARCON M.D., ATTENDING RADIOLOGIST  This document has been electronically signed. Apr 11 2019 12:04PM                < end of copied text >

## 2019-04-11 NOTE — DISCHARGE NOTE NURSING/CASE MANAGEMENT/SOCIAL WORK - NSDCDPATPORTLINK_GEN_ALL_CORE
You can access the [facility] Patient Portal, offered by [facility], by registering with the following website: [email or web address]

## 2019-04-11 NOTE — ED PROVIDER NOTE - OBJECTIVE STATEMENT
28 year old male with pmhx of asthma, and appendectomy, presents with abscess and pain to right groin x 2 days. Pt was seen few months ago in ED for scrotal abscess, however, signed out AMA because he did not want it drained, improved with PO axbx. Pt admits noted swelling again and pain to area. No body aches, chills, urinary symptoms, abdominal pain, N/V/D, or testicular trauma.

## 2019-04-11 NOTE — ED PROVIDER NOTE - PHYSICAL EXAMINATION
CONST: Well appearing in NAD  EYES: PERRL, EOMI, Sclera and conjunctiva clear.   ENT: No nasal discharge. TM's clear B/L without drainage. Oropharynx normal appearing, no erythema or exudates. No abscess or swelling. Uvula midline.  NECK: Non-tender, no meningeal signs. normal ROM. supple   CARD: Normal S1 S2; Normal rate and rhythm  RESP: Equal BS B/L, No wheezes, rhonchi or rales. No distress  GI: Soft, non-tender, non-distended. no cva tenderness. normal BS  : + abscess noted to right scrotum  MS: Normal ROM in all extremities. No midline spinal tenderness. pulses 2 +. no calf tenderness or swelling  SKIN: Warm, dry, no acute rashes. Good turgor

## 2019-04-11 NOTE — ED PROVIDER NOTE - PROGRESS NOTE DETAILS
urology aware, will evaluate pt case d/w urology, will evaluate pt urology at bedside draining abscess urology at bedside draining abscess.. recommends bactrim PO and follow up with urology on monday s/p I and D by urology with large amount of discharge drained, pt reports significant improvement, will d/c with pain control, Bactrim, outpt  f/u, VNS services for daily packing changes were arranged with .

## 2019-04-11 NOTE — ED PROVIDER NOTE - ATTENDING CONTRIBUTION TO CARE
29 y/o male h/o asthma, appendectomy, now presents with rt scrotal pain and swelling x 2 days. Pt states he has an abscess in his rt groin, constant, no radiation, denies modifying factors, denies fever or associated complaints at present. Pt had similar episode 11/2018 which was treated successfully with oral abx.    VSS, awake, alert, non-toxic appearing, ears clear, oropharynx clear, mmm, no JVD or bruit, no skin rash or lesions, chest CTAB, non-labored breathing, no w/r/r, +S1/S2, RRR, no m/r/g, abdomen soft, NT, ND, +BS, the pt was examined with a chaperone (ALLISON Ndiaye); fluctuant mass  over rt scrotal area extending to perineal region, minimal erythema and warmth, otherwise external genitalia normal, no other lesions or discolorations noted, no peripheral edema or deformities, alert, clear speech and steady gait.    Old chart reviewed.  I have reviewed and agree with the nursing note, except as documented in my note.

## 2019-04-12 DIAGNOSIS — Z71.89 OTHER SPECIFIED COUNSELING: ICD-10-CM

## 2019-04-13 ENCOUNTER — EMERGENCY (EMERGENCY)
Facility: HOSPITAL | Age: 29
LOS: 0 days | Discharge: HOME | End: 2019-04-14
Admitting: EMERGENCY MEDICINE
Payer: MEDICAID

## 2019-04-13 VITALS
TEMPERATURE: 99 F | SYSTOLIC BLOOD PRESSURE: 135 MMHG | OXYGEN SATURATION: 100 % | WEIGHT: 220.02 LBS | HEART RATE: 87 BPM | RESPIRATION RATE: 18 BRPM | DIASTOLIC BLOOD PRESSURE: 82 MMHG

## 2019-04-13 DIAGNOSIS — Z90.49 ACQUIRED ABSENCE OF OTHER SPECIFIED PARTS OF DIGESTIVE TRACT: Chronic | ICD-10-CM

## 2019-04-13 DIAGNOSIS — N45.4 ABSCESS OF EPIDIDYMIS OR TESTIS: ICD-10-CM

## 2019-04-13 PROCEDURE — 99284 EMERGENCY DEPT VISIT MOD MDM: CPT | Mod: 25

## 2019-04-14 PROBLEM — Z78.9 OTHER SPECIFIED HEALTH STATUS: Chronic | Status: ACTIVE | Noted: 2019-04-11

## 2019-04-14 RX ORDER — MORPHINE SULFATE 50 MG/1
8 CAPSULE, EXTENDED RELEASE ORAL ONCE
Qty: 0 | Refills: 0 | Status: DISCONTINUED | OUTPATIENT
Start: 2019-04-14 | End: 2019-04-14

## 2019-04-14 RX ADMIN — MORPHINE SULFATE 8 MILLIGRAM(S): 50 CAPSULE, EXTENDED RELEASE ORAL at 00:06

## 2019-04-14 NOTE — ED PROVIDER NOTE - PHYSICAL EXAMINATION
GENERAL:  well appearing, non-toxic male in no acute distress  PULM: CTAB. Normal respiratory effort. No respiratory distress. No wheezes, stridor, rales or rhonchi. No retractions  CV: RRR, no M/R/G.   ABD: Soft, non-tender, non-distended  : + 3 cm incision to right abscess with packing in place. + surrounding induration and swelling. no increased warmth.   MSK: moving all extremities.   NEURO: A+Ox3, no sensory/motor deficits  PSYCH: appropriate behavior, cooperative.

## 2019-04-14 NOTE — ED ADULT NURSE NOTE - NSIMPLEMENTINTERV_GEN_ALL_ED
Implemented All Universal Safety Interventions:  [place] to call system. Call bell, personal items and telephone within reach. Instruct patient to call for assistance. Room bathroom lighting operational. Non-slip footwear when patient is off stretcher. Physically safe environment: no spills, clutter or unnecessary equipment. Stretcher in lowest position, wheels locked, appropriate side rails in place.

## 2019-04-14 NOTE — ED PROVIDER NOTE - NSFOLLOWUPCLINICS_GEN_ALL_ED_FT
[facility]  Urology  .  [place]   Phone: [phone]  Fax:   Follow Up Time: 1-3 Days

## 2019-04-14 NOTE — ED PROVIDER NOTE - CLINICAL SUMMARY MEDICAL DECISION MAKING FREE TEXT BOX
Patient here for packing change of scrotal abscess. Packing removed and replaced (with less packing due to patient refusal of additional packing.) Patient still on bactrim, has VS scheduled for Monday and urology follow up. Patient understands return precautions. No worsening infection at this time.

## 2019-04-14 NOTE — ED PROVIDER NOTE - CARE PROVIDER_API CALL
Gilberto Murcia)  [facility]  [address] [place] [address], [address]  [place] [address]  Phone: [phone]  Fax: [phone]  Follow Up Time:

## 2019-04-14 NOTE — ED PROVIDER NOTE - NS ED ROS FT
Constitutional: no fever, chills or generalized weakness  Gastrointestinal: no nausea, vomiting or diarrhea. no abdominal pain.  : + right sided scrotal abscess. no worsening swelling, erythema or pain. no urinary sxs, no flank pain.  Integumentary: no rash or skin changes. no edema  Neurological: no headache, no dizziness, no visual changes, no UE/LE weakness or paresthesias.

## 2019-04-14 NOTE — ED PROVIDER NOTE - PROGRESS NOTE DETAILS
Patient only tolerating minimal packing even after dose of morphine. Patient refusing anymore packing at this time or for anyone to try. Patient understands risks of not fully packing abscess. Patient has visiting nurse set up for Monday. Patient has urology appointment for next week. Patient understands return precautions.

## 2019-04-14 NOTE — ED PROVIDER NOTE - OBJECTIVE STATEMENT
29 yo male with no significant pmh presents to the ED for wound check of abscess to right scrotum. Patient was evaluated on 4/11 for scrotal abscess. Abscess was drained by urology, packing was placed and d/ana on bactrim. Patient was suppose to have a visiting nurse come to house today to change packing but they had an incorrect address. Visiting nurse services was rescheduled for Monday with correct information. Patient suppose to follow up with urology next week. Patient came to ED today for packing change. Patient states abscess has improved. Denies fever, chills, N/V. No additional complaints.  No worsening pain or swelling.

## 2019-04-15 LAB
CULTURE RESULTS: SIGNIFICANT CHANGE UP
SPECIMEN SOURCE: SIGNIFICANT CHANGE UP

## 2019-04-16 LAB
CULTURE RESULTS: SIGNIFICANT CHANGE UP
SPECIMEN SOURCE: SIGNIFICANT CHANGE UP

## 2020-07-21 NOTE — ED ADULT TRIAGE NOTE - AS TEMP SITE
From: Alice Lawson  To: Arsh Odom MD  Sent: 7/20/2020 9:45 PM CDT  Subject: Other    Dr. Odom,    Per the conversation today, the doctor I am currently seeing for the cyst removal, Dr. Ayala is unable to complete the removal as he is moving. Dr. Yeh is unavailable for another month, which is too long for me to wait. Can you recommend another surgeon that can do this procedure preferably this week? I am in pain, and need this removed as soon as possible.    Alice Lawson   oral

## 2023-08-17 NOTE — ED ADULT TRIAGE NOTE - MODE OF ARRIVAL
Walk in
Partially impaired: cannot see medication labels or newsprint, but can see obstacles in path, and the surrounding layout; can count fingers at arm's length